# Patient Record
Sex: MALE
[De-identification: names, ages, dates, MRNs, and addresses within clinical notes are randomized per-mention and may not be internally consistent; named-entity substitution may affect disease eponyms.]

---

## 2023-10-05 ENCOUNTER — DASHBOARD ENCOUNTERS (OUTPATIENT)
Age: 23
End: 2023-10-05

## 2023-10-05 ENCOUNTER — OFFICE VISIT (OUTPATIENT)
Dept: URBAN - METROPOLITAN AREA CLINIC 128 | Facility: CLINIC | Age: 23
End: 2023-10-05
Payer: COMMERCIAL

## 2023-10-05 VITALS
DIASTOLIC BLOOD PRESSURE: 68 MMHG | BODY MASS INDEX: 16.77 KG/M2 | HEIGHT: 72 IN | WEIGHT: 123.8 LBS | SYSTOLIC BLOOD PRESSURE: 100 MMHG | HEART RATE: 68 BPM | TEMPERATURE: 98.1 F

## 2023-10-05 DIAGNOSIS — K51.80 CHRONIC PANCOLONIC ULCERATIVE COLITIS: ICD-10-CM

## 2023-10-05 DIAGNOSIS — K21.9 GASTROESOPHAGEAL REFLUX DISEASE WITHOUT ESOPHAGITIS: ICD-10-CM

## 2023-10-05 PROBLEM — 266435005: Status: ACTIVE | Noted: 2023-10-05

## 2023-10-05 PROBLEM — 64766004: Status: ACTIVE | Noted: 2023-10-05

## 2023-10-05 PROCEDURE — 99204 OFFICE O/P NEW MOD 45 MIN: CPT | Performed by: PHYSICIAN ASSISTANT

## 2023-10-05 RX ORDER — FAMOTIDINE 20 MG/1
1 TABLET AT BEDTIME TABLET, FILM COATED ORAL ONCE A DAY
Qty: 30 TABLET | Refills: 5 | OUTPATIENT
Start: 2023-10-05

## 2023-10-05 NOTE — HPI-TODAY'S VISIT:
The patient is a new patient who has ulcerative colitis and he is here to establish care with a new GI. He was seen by [facility]  and he was diagnosed with UC at age 19. He has had a colectomy, reversed ostomy,  and a J pouch. He offers no UC flare currently. He denies abdominal pain, diarrhea, rectal bleeding or mucous. He elicits having acid reflux especially if he is hungry. Symptoms are refractory to lansoprazole q am. His last colonosscopy was in 02/2021 and it revealed UC and his ast EGD was in 2021 and it was normal per patient. Since he has had his entire colon removed, he has had only pouchoscopies and his last one was normal per patient in 02/2023.